# Patient Record
Sex: FEMALE | Race: WHITE | URBAN - METROPOLITAN AREA
[De-identification: names, ages, dates, MRNs, and addresses within clinical notes are randomized per-mention and may not be internally consistent; named-entity substitution may affect disease eponyms.]

---

## 2021-01-19 ENCOUNTER — TELEPHONE ENCOUNTER (OUTPATIENT)
Dept: URBAN - METROPOLITAN AREA CLINIC 82 | Facility: CLINIC | Age: 46
End: 2021-01-19

## 2021-01-19 ENCOUNTER — OFFICE VISIT (OUTPATIENT)
Dept: URBAN - METROPOLITAN AREA CLINIC 115 | Facility: CLINIC | Age: 46
End: 2021-01-19
Payer: COMMERCIAL

## 2021-01-19 ENCOUNTER — WEB ENCOUNTER (OUTPATIENT)
Dept: URBAN - METROPOLITAN AREA CLINIC 115 | Facility: CLINIC | Age: 46
End: 2021-01-19

## 2021-01-19 ENCOUNTER — LAB OUTSIDE AN ENCOUNTER (OUTPATIENT)
Dept: URBAN - METROPOLITAN AREA CLINIC 115 | Facility: CLINIC | Age: 46
End: 2021-01-19

## 2021-01-19 VITALS
RESPIRATION RATE: 16 BRPM | HEIGHT: 67 IN | HEART RATE: 114 BPM | TEMPERATURE: 97.8 F | BODY MASS INDEX: 45.99 KG/M2 | DIASTOLIC BLOOD PRESSURE: 86 MMHG | WEIGHT: 293 LBS | SYSTOLIC BLOOD PRESSURE: 149 MMHG

## 2021-01-19 DIAGNOSIS — E66.01 MORBID OBESITY: ICD-10-CM

## 2021-01-19 DIAGNOSIS — R10.11 RIGHT UPPER QUADRANT ABDOMINAL PAIN: ICD-10-CM

## 2021-01-19 DIAGNOSIS — R13.14 PHARYNGOESOPHAGEAL DYSPHAGIA: ICD-10-CM

## 2021-01-19 PROBLEM — 428882003: Status: ACTIVE | Noted: 2021-01-19

## 2021-01-19 PROBLEM — 40739000: Status: ACTIVE | Noted: 2021-01-19

## 2021-01-19 PROBLEM — 59621000: Status: ACTIVE | Noted: 2021-01-19

## 2021-01-19 PROBLEM — 301717006: Status: ACTIVE | Noted: 2021-01-19

## 2021-01-19 PROCEDURE — G8482 FLU IMMUNIZE ORDER/ADMIN: HCPCS | Performed by: INTERNAL MEDICINE

## 2021-01-19 PROCEDURE — 4004F PT TOBACCO SCREEN RCVD TLK: CPT | Performed by: INTERNAL MEDICINE

## 2021-01-19 PROCEDURE — G8427 DOCREV CUR MEDS BY ELIG CLIN: HCPCS | Performed by: INTERNAL MEDICINE

## 2021-01-19 PROCEDURE — 99204 OFFICE O/P NEW MOD 45 MIN: CPT | Performed by: INTERNAL MEDICINE

## 2021-01-19 PROCEDURE — G8417 CALC BMI ABV UP PARAM F/U: HCPCS | Performed by: INTERNAL MEDICINE

## 2021-01-19 RX ORDER — LOSARTAN POTASSIUM 100 MG/1
1 TABLET TABLET ORAL ONCE A DAY
Status: ACTIVE | COMMUNITY

## 2021-01-19 RX ORDER — HYDROCHLOROTHIAZIDE 12.5 MG/1
1 CAPSULE IN THE MORNING CAPSULE ORAL ONCE A DAY
Status: ACTIVE | COMMUNITY

## 2021-01-19 NOTE — PHYSICAL EXAM GASTROINTESTINAL
obese,normal bowel sounds , no masses palpable , no guarding or rigidity , soft, mild tender on epigastrum, nondistended

## 2021-01-19 NOTE — HPI-OTHER HISTORIES
Case of 46 y/o female A1 with HTN,morbid obesity, hx of cholecystectomy that present with RUQ and right flank pain for months with no other alarm symptoms like melena, abnormal weight loss, early satiety or rectal bleeding. She also mention dysphagia to solid must recently.

## 2021-01-22 ENCOUNTER — OFFICE VISIT (OUTPATIENT)
Dept: URBAN - METROPOLITAN AREA MEDICAL CENTER 31 | Facility: MEDICAL CENTER | Age: 46
End: 2021-01-22
Payer: COMMERCIAL

## 2021-01-22 DIAGNOSIS — K22.8 COLUMNAR-LINED ESOPHAGUS: ICD-10-CM

## 2021-01-22 DIAGNOSIS — K29.60 ADENOPAPILLOMATOSIS GASTRICA: ICD-10-CM

## 2021-01-22 PROBLEM — 72007001: Status: ACTIVE | Noted: 2021-01-22

## 2021-01-22 PROCEDURE — 43239 EGD BIOPSY SINGLE/MULTIPLE: CPT | Performed by: INTERNAL MEDICINE

## 2021-01-22 RX ORDER — LOSARTAN POTASSIUM 100 MG/1
1 TABLET TABLET ORAL ONCE A DAY
Status: ACTIVE | COMMUNITY

## 2021-01-22 RX ORDER — SUCRALFATE 1 G/1
1 TABLET ON AN EMPTY STOMACH TABLET ORAL TWICE A DAY
Qty: 60 TABLET | Refills: 2 | OUTPATIENT
Start: 2021-01-22 | End: 2021-04-22

## 2021-01-22 RX ORDER — OMEPRAZOLE 20 MG/1
1 CAPSULE CAPSULE, DELAYED RELEASE ORAL TWICE A DAY
Qty: 60 CAPSULE | Refills: 3 | OUTPATIENT
Start: 2021-01-22

## 2021-01-22 RX ORDER — HYDROCHLOROTHIAZIDE 12.5 MG/1
1 CAPSULE IN THE MORNING CAPSULE ORAL ONCE A DAY
Status: ACTIVE | COMMUNITY

## 2021-02-09 ENCOUNTER — LAB OUTSIDE AN ENCOUNTER (OUTPATIENT)
Dept: URBAN - METROPOLITAN AREA CLINIC 115 | Facility: CLINIC | Age: 46
End: 2021-02-09

## 2021-02-09 ENCOUNTER — OFFICE VISIT (OUTPATIENT)
Dept: URBAN - METROPOLITAN AREA CLINIC 115 | Facility: CLINIC | Age: 46
End: 2021-02-09
Payer: COMMERCIAL

## 2021-02-09 VITALS
WEIGHT: 293 LBS | SYSTOLIC BLOOD PRESSURE: 148 MMHG | DIASTOLIC BLOOD PRESSURE: 85 MMHG | HEART RATE: 106 BPM | RESPIRATION RATE: 18 BRPM | HEIGHT: 67 IN | BODY MASS INDEX: 45.99 KG/M2 | TEMPERATURE: 98 F

## 2021-02-09 DIAGNOSIS — R94.5 ABNORMAL LIVER FUNCTION TEST: ICD-10-CM

## 2021-02-09 DIAGNOSIS — K27.9 PUD (PEPTIC ULCER DISEASE): ICD-10-CM

## 2021-02-09 DIAGNOSIS — K29.70 GASTRITIS DETERMINED BY ENDOSCOPY: ICD-10-CM

## 2021-02-09 DIAGNOSIS — E66.01 MORBID OBESITY: ICD-10-CM

## 2021-02-09 PROBLEM — 13200003: Status: ACTIVE | Noted: 2021-01-22

## 2021-02-09 PROBLEM — 238136002: Status: ACTIVE | Noted: 2021-01-19

## 2021-02-09 PROBLEM — 4556007: Status: ACTIVE | Noted: 2021-01-22

## 2021-02-09 PROBLEM — 166603001: Status: ACTIVE | Noted: 2021-02-09

## 2021-02-09 PROCEDURE — G8482 FLU IMMUNIZE ORDER/ADMIN: HCPCS | Performed by: INTERNAL MEDICINE

## 2021-02-09 PROCEDURE — 4004F PT TOBACCO SCREEN RCVD TLK: CPT | Performed by: INTERNAL MEDICINE

## 2021-02-09 PROCEDURE — G8427 DOCREV CUR MEDS BY ELIG CLIN: HCPCS | Performed by: INTERNAL MEDICINE

## 2021-02-09 PROCEDURE — 99214 OFFICE O/P EST MOD 30 MIN: CPT | Performed by: INTERNAL MEDICINE

## 2021-02-09 PROCEDURE — G8417 CALC BMI ABV UP PARAM F/U: HCPCS | Performed by: INTERNAL MEDICINE

## 2021-02-09 RX ORDER — LOSARTAN POTASSIUM 100 MG/1
1 TABLET TABLET ORAL ONCE A DAY
Status: ACTIVE | COMMUNITY

## 2021-02-09 RX ORDER — SUCRALFATE 1 G/1
1 TABLET ON AN EMPTY STOMACH TABLET ORAL TWICE A DAY
Qty: 60 TABLET | Refills: 2 | Status: ACTIVE | COMMUNITY
Start: 2021-01-22 | End: 2021-04-22

## 2021-02-09 RX ORDER — OMEPRAZOLE 20 MG/1
1 CAPSULE CAPSULE, DELAYED RELEASE ORAL TWICE A DAY
Qty: 60 CAPSULE | Refills: 3 | Status: ACTIVE | COMMUNITY
Start: 2021-01-22

## 2021-02-09 RX ORDER — HYDROCHLOROTHIAZIDE 12.5 MG/1
1 CAPSULE IN THE MORNING CAPSULE ORAL ONCE A DAY
Status: ACTIVE | COMMUNITY

## 2021-02-09 NOTE — HPI-OTHER HISTORIES
Case of 44 y/o  lady with MO, HTN,HLD that had gastroduodenitis on EGD with negative biopsies for h. pylori that came for f/up. Recent labs  from cardiology visit showed elevated ALT 39. Patient still with some discomfort on the Right quadrants, she did not had a gallbladder. NAFLD is high on my differential given central obesity, HTN and HLD. She is doing much better from the gastritis stand point finishing PPI's BID and sucralfate.

## 2021-05-03 ENCOUNTER — TELEPHONE ENCOUNTER (OUTPATIENT)
Dept: URBAN - METROPOLITAN AREA CLINIC 82 | Facility: CLINIC | Age: 46
End: 2021-05-03

## 2021-05-18 ENCOUNTER — OFFICE VISIT (OUTPATIENT)
Dept: URBAN - METROPOLITAN AREA CLINIC 115 | Facility: CLINIC | Age: 46
End: 2021-05-18

## 2021-05-18 RX ORDER — OMEPRAZOLE 20 MG/1
1 CAPSULE CAPSULE, DELAYED RELEASE ORAL TWICE A DAY
Qty: 60 CAPSULE | Refills: 3 | Status: ACTIVE | COMMUNITY
Start: 2021-01-22

## 2021-05-18 RX ORDER — HYDROCHLOROTHIAZIDE 12.5 MG/1
1 CAPSULE IN THE MORNING CAPSULE ORAL ONCE A DAY
Status: ACTIVE | COMMUNITY

## 2021-05-18 RX ORDER — LOSARTAN POTASSIUM 100 MG/1
1 TABLET TABLET ORAL ONCE A DAY
Status: ACTIVE | COMMUNITY

## 2021-06-01 ENCOUNTER — OFFICE VISIT (OUTPATIENT)
Dept: URBAN - METROPOLITAN AREA CLINIC 115 | Facility: CLINIC | Age: 46
End: 2021-06-01
Payer: COMMERCIAL

## 2021-06-01 VITALS
DIASTOLIC BLOOD PRESSURE: 102 MMHG | TEMPERATURE: 97.9 F | RESPIRATION RATE: 16 BRPM | SYSTOLIC BLOOD PRESSURE: 163 MMHG | BODY MASS INDEX: 45.99 KG/M2 | WEIGHT: 293 LBS | HEIGHT: 67 IN | HEART RATE: 98 BPM

## 2021-06-01 DIAGNOSIS — E66.01 MORBID OBESITY: ICD-10-CM

## 2021-06-01 DIAGNOSIS — R10.11 RIGHT UPPER QUADRANT ABDOMINAL PAIN: ICD-10-CM

## 2021-06-01 DIAGNOSIS — R94.5 ABNORMAL LIVER FUNCTION TEST: ICD-10-CM

## 2021-06-01 PROCEDURE — 99214 OFFICE O/P EST MOD 30 MIN: CPT | Performed by: INTERNAL MEDICINE

## 2021-06-01 RX ORDER — HYDROCHLOROTHIAZIDE 12.5 MG/1
1 CAPSULE IN THE MORNING CAPSULE ORAL ONCE A DAY
Status: ACTIVE | COMMUNITY

## 2021-06-01 RX ORDER — LOSARTAN POTASSIUM 100 MG/1
1 TABLET TABLET ORAL ONCE A DAY
Status: ACTIVE | COMMUNITY

## 2021-06-01 RX ORDER — OMEPRAZOLE 20 MG/1
1 CAPSULE CAPSULE, DELAYED RELEASE ORAL TWICE A DAY
Qty: 60 CAPSULE | Refills: 3 | Status: ACTIVE | COMMUNITY
Start: 2021-01-22

## 2021-06-01 NOTE — HPI-TODAY'S VISIT:
44 y/o White female with morbid obesity, gastritis and abnormal LFT's and U/S tht came for follow up after liver MRI. She feel fine from abdominal pain using PPI. MRI shows steatosis and no liver solid lesions, normal pancreas. Will monitor patient for now , she will get labs on July with her cardiologist will request report for my review. Continue weight loss and exercise.

## 2021-07-12 ENCOUNTER — WEB ENCOUNTER (OUTPATIENT)
Dept: URBAN - METROPOLITAN AREA CLINIC 82 | Facility: CLINIC | Age: 46
End: 2021-07-12

## 2022-07-13 ENCOUNTER — OFFICE VISIT (OUTPATIENT)
Dept: URBAN - METROPOLITAN AREA CLINIC 115 | Facility: CLINIC | Age: 47
End: 2022-07-13

## 2023-04-18 ENCOUNTER — DASHBOARD ENCOUNTERS (OUTPATIENT)
Age: 48
End: 2023-04-18

## 2023-04-18 ENCOUNTER — LAB OUTSIDE AN ENCOUNTER (OUTPATIENT)
Dept: URBAN - METROPOLITAN AREA CLINIC 115 | Facility: CLINIC | Age: 48
End: 2023-04-18

## 2023-04-18 ENCOUNTER — OFFICE VISIT (OUTPATIENT)
Dept: URBAN - METROPOLITAN AREA CLINIC 115 | Facility: CLINIC | Age: 48
End: 2023-04-18
Payer: COMMERCIAL

## 2023-04-18 VITALS
RESPIRATION RATE: 18 BRPM | WEIGHT: 293 LBS | BODY MASS INDEX: 45.99 KG/M2 | SYSTOLIC BLOOD PRESSURE: 143 MMHG | HEART RATE: 107 BPM | HEIGHT: 67 IN | DIASTOLIC BLOOD PRESSURE: 97 MMHG | TEMPERATURE: 98.2 F

## 2023-04-18 DIAGNOSIS — E66.01 MORBID OBESITY: ICD-10-CM

## 2023-04-18 DIAGNOSIS — Z12.12 ENCOUNTER FOR SCREENING FOR MALIGNANT NEOPLASM OF RECTUM: ICD-10-CM

## 2023-04-18 DIAGNOSIS — R94.5 ABNORMAL LIVER FUNCTION TEST: ICD-10-CM

## 2023-04-18 DIAGNOSIS — Z12.11 ENCOUNTER FOR SCREENING FOR MALIGNANT NEOPLASM OF COLON: ICD-10-CM

## 2023-04-18 PROBLEM — 305058001: Status: ACTIVE | Noted: 2023-04-18

## 2023-04-18 PROCEDURE — 99213 OFFICE O/P EST LOW 20 MIN: CPT | Performed by: INTERNAL MEDICINE

## 2023-04-18 RX ORDER — LORATADINE 10 MG/1
1 TABLET TABLET ORAL ONCE A DAY
Status: ACTIVE | COMMUNITY

## 2023-04-18 RX ORDER — MILK THISTLE 150 MG
AS DIRECTED CAPSULE ORAL
Status: ACTIVE | COMMUNITY

## 2023-04-18 RX ORDER — ECHINACEA 380 MG
AS DIRECTED CAPSULE ORAL
Status: ACTIVE | COMMUNITY

## 2023-04-18 RX ORDER — ATORVASTATIN CALCIUM 20 MG/1
1 TABLET TABLET, FILM COATED ORAL ONCE A DAY
Status: ACTIVE | COMMUNITY

## 2023-04-18 RX ORDER — HYDROCHLOROTHIAZIDE 12.5 MG/1
1 CAPSULE IN THE MORNING CAPSULE ORAL ONCE A DAY
Status: ON HOLD | COMMUNITY

## 2023-04-18 RX ORDER — OMEPRAZOLE 20 MG/1
1 CAPSULE CAPSULE, DELAYED RELEASE ORAL TWICE A DAY
Qty: 60 CAPSULE | Refills: 3 | Status: ACTIVE | COMMUNITY
Start: 2021-01-22

## 2023-04-18 RX ORDER — LOSARTAN POTASSIUM 100 MG/1
1 TABLET TABLET ORAL ONCE A DAY
Status: ON HOLD | COMMUNITY

## 2023-04-18 RX ORDER — LEVOCETIRIZINE DIHYDROCHLORIDE 5 MG/1
1 TABLET IN THE EVENING TABLET, FILM COATED ORAL ONCE A DAY
Status: ACTIVE | COMMUNITY

## 2023-04-18 RX ORDER — FLUTICASONE PROPIONATE 50 UG/1
1 SPRAY IN EACH NOSTRIL SPRAY, METERED NASAL ONCE A DAY
Status: ACTIVE | COMMUNITY

## 2023-04-18 RX ORDER — KETOTIFEN FUMARATE 0.25 MG/ML
1 DROP INTO AFFECTED EYE SOLUTION OPHTHALMIC TWICE A DAY
Status: ACTIVE | COMMUNITY

## 2023-04-18 RX ORDER — VIT C/ZN GLUC/HERBAL NO.325 90 MG-15MG
AS DIRECTED LOZENGE MUCOUS MEMBRANE
Status: ACTIVE | COMMUNITY

## 2023-04-18 RX ORDER — LOSARTAN POTASSIUM AND HYDROCHLOROTHIAZIDE 100; 25 MG/1; MG/1
1 TABLET TABLET, FILM COATED ORAL ONCE A DAY
Status: ACTIVE | COMMUNITY

## 2023-04-18 RX ORDER — ZINC GLUCONATE 50 MG
1 TABLET TABLET ORAL ONCE A DAY
Status: ACTIVE | COMMUNITY

## 2023-04-18 NOTE — HPI-TODAY'S VISIT:
no significant health events since 7/2021. recent labs from Cardiology with normal liver enzymes, elevate fasting glucose and A1c 6.1 on pre-diabetic range.Will recommend endocrine eval to consider medical management of pre-diabetes and morbid obesity

## 2023-04-24 ENCOUNTER — WEB ENCOUNTER (OUTPATIENT)
Dept: URBAN - METROPOLITAN AREA CLINIC 92 | Facility: CLINIC | Age: 48
End: 2023-04-24

## 2024-07-22 ENCOUNTER — TELEPHONE ENCOUNTER (OUTPATIENT)
Dept: URBAN - METROPOLITAN AREA CLINIC 82 | Facility: CLINIC | Age: 49
End: 2024-07-22

## 2024-07-30 ENCOUNTER — TELEPHONE ENCOUNTER (OUTPATIENT)
Dept: URBAN - METROPOLITAN AREA CLINIC 115 | Facility: CLINIC | Age: 49
End: 2024-07-30

## 2024-08-11 ENCOUNTER — WEB ENCOUNTER (OUTPATIENT)
Dept: URBAN - METROPOLITAN AREA CLINIC 115 | Facility: CLINIC | Age: 49
End: 2024-08-11

## 2024-09-04 ENCOUNTER — OFFICE VISIT (OUTPATIENT)
Dept: URBAN - METROPOLITAN AREA SURGERY CENTER 13 | Facility: SURGERY CENTER | Age: 49
End: 2024-09-04

## 2024-10-11 ENCOUNTER — OFFICE VISIT (OUTPATIENT)
Dept: URBAN - METROPOLITAN AREA MEDICAL CENTER 24 | Facility: MEDICAL CENTER | Age: 49
End: 2024-10-11

## 2024-10-11 RX ORDER — ATORVASTATIN CALCIUM 20 MG/1
1 TABLET TABLET, FILM COATED ORAL ONCE A DAY
Status: ACTIVE | COMMUNITY

## 2024-10-11 RX ORDER — LOSARTAN POTASSIUM AND HYDROCHLOROTHIAZIDE 100; 25 MG/1; MG/1
1 TABLET TABLET, FILM COATED ORAL ONCE A DAY
Status: ACTIVE | COMMUNITY

## 2024-10-11 RX ORDER — ECHINACEA 380 MG
AS DIRECTED CAPSULE ORAL
Status: ACTIVE | COMMUNITY

## 2024-10-11 RX ORDER — KETOTIFEN FUMARATE 0.25 MG/ML
1 DROP INTO AFFECTED EYE SOLUTION OPHTHALMIC TWICE A DAY
Status: ACTIVE | COMMUNITY

## 2024-10-11 RX ORDER — VIT C/ZN GLUC/HERBAL NO.325 90 MG-15MG
AS DIRECTED LOZENGE MUCOUS MEMBRANE
Status: ACTIVE | COMMUNITY

## 2024-10-11 RX ORDER — LEVOCETIRIZINE DIHYDROCHLORIDE 5 MG/1
1 TABLET IN THE EVENING TABLET, FILM COATED ORAL ONCE A DAY
Status: ACTIVE | COMMUNITY

## 2024-10-11 RX ORDER — FLUTICASONE PROPIONATE 50 UG/1
1 SPRAY IN EACH NOSTRIL SPRAY, METERED NASAL ONCE A DAY
Status: ACTIVE | COMMUNITY

## 2024-10-11 RX ORDER — HYDROCHLOROTHIAZIDE 12.5 MG/1
1 CAPSULE IN THE MORNING CAPSULE ORAL ONCE A DAY
Status: ON HOLD | COMMUNITY

## 2024-10-11 RX ORDER — OMEPRAZOLE 20 MG/1
1 CAPSULE CAPSULE, DELAYED RELEASE ORAL TWICE A DAY
Qty: 60 CAPSULE | Refills: 3 | Status: ACTIVE | COMMUNITY
Start: 2021-01-22

## 2024-10-11 RX ORDER — MILK THISTLE 150 MG
AS DIRECTED CAPSULE ORAL
Status: ACTIVE | COMMUNITY

## 2024-10-11 RX ORDER — LOSARTAN POTASSIUM 100 MG/1
1 TABLET TABLET ORAL ONCE A DAY
Status: ON HOLD | COMMUNITY

## 2024-10-11 RX ORDER — ZINC GLUCONATE 50 MG
1 TABLET TABLET ORAL ONCE A DAY
Status: ACTIVE | COMMUNITY

## 2024-10-11 RX ORDER — LORATADINE 10 MG/1
1 TABLET TABLET ORAL ONCE A DAY
Status: ACTIVE | COMMUNITY